# Patient Record
Sex: MALE | Race: WHITE
[De-identification: names, ages, dates, MRNs, and addresses within clinical notes are randomized per-mention and may not be internally consistent; named-entity substitution may affect disease eponyms.]

---

## 2018-02-16 ENCOUNTER — HOSPITAL ENCOUNTER (OUTPATIENT)
Dept: HOSPITAL 72 - SUR | Age: 83
Discharge: HOME | End: 2018-02-16
Payer: MEDICARE

## 2018-02-16 VITALS — SYSTOLIC BLOOD PRESSURE: 141 MMHG | DIASTOLIC BLOOD PRESSURE: 82 MMHG

## 2018-02-16 VITALS — DIASTOLIC BLOOD PRESSURE: 69 MMHG | SYSTOLIC BLOOD PRESSURE: 129 MMHG

## 2018-02-16 VITALS — SYSTOLIC BLOOD PRESSURE: 140 MMHG | DIASTOLIC BLOOD PRESSURE: 76 MMHG

## 2018-02-16 VITALS — SYSTOLIC BLOOD PRESSURE: 138 MMHG | DIASTOLIC BLOOD PRESSURE: 74 MMHG

## 2018-02-16 VITALS — DIASTOLIC BLOOD PRESSURE: 83 MMHG | SYSTOLIC BLOOD PRESSURE: 143 MMHG

## 2018-02-16 VITALS — DIASTOLIC BLOOD PRESSURE: 74 MMHG | SYSTOLIC BLOOD PRESSURE: 139 MMHG

## 2018-02-16 VITALS — DIASTOLIC BLOOD PRESSURE: 60 MMHG | SYSTOLIC BLOOD PRESSURE: 119 MMHG

## 2018-02-16 VITALS — SYSTOLIC BLOOD PRESSURE: 166 MMHG | DIASTOLIC BLOOD PRESSURE: 88 MMHG

## 2018-02-16 VITALS — SYSTOLIC BLOOD PRESSURE: 132 MMHG | DIASTOLIC BLOOD PRESSURE: 71 MMHG

## 2018-02-16 VITALS — BODY MASS INDEX: 21.33 KG/M2 | WEIGHT: 144 LBS | HEIGHT: 69 IN

## 2018-02-16 VITALS — SYSTOLIC BLOOD PRESSURE: 135 MMHG | DIASTOLIC BLOOD PRESSURE: 71 MMHG

## 2018-02-16 VITALS — SYSTOLIC BLOOD PRESSURE: 143 MMHG | DIASTOLIC BLOOD PRESSURE: 80 MMHG

## 2018-02-16 VITALS — SYSTOLIC BLOOD PRESSURE: 124 MMHG | DIASTOLIC BLOOD PRESSURE: 70 MMHG

## 2018-02-16 DIAGNOSIS — E10.22: ICD-10-CM
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
## 2018-02-16 DIAGNOSIS — Z81.8: ICD-10-CM

## 2018-02-16 DIAGNOSIS — Z81.1: ICD-10-CM

## 2018-02-16 DIAGNOSIS — I10: ICD-10-CM

## 2018-02-16 DIAGNOSIS — M19.90: ICD-10-CM

## 2018-02-16 DIAGNOSIS — K21.9: ICD-10-CM

## 2018-02-16 DIAGNOSIS — Z82.49: ICD-10-CM
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
## 2018-02-16 DIAGNOSIS — N18.9: ICD-10-CM

## 2018-02-16 DIAGNOSIS — I12.9: ICD-10-CM

## 2018-02-16 DIAGNOSIS — T81.32XA: Primary | ICD-10-CM
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
## 2018-02-16 DIAGNOSIS — E78.5: ICD-10-CM

## 2018-02-16 PROCEDURE — 66250 FOLLOW-UP SURGERY OF EYE: CPT

## 2018-02-16 PROCEDURE — 94003 VENT MGMT INPAT SUBQ DAY: CPT

## 2018-02-16 PROCEDURE — 94150 VITAL CAPACITY TEST: CPT

## 2018-02-16 PROCEDURE — 82962 GLUCOSE BLOOD TEST: CPT

## 2018-02-16 NOTE — OPERATIVE NOTE - DICTATED
DATE OF OPERATION:  02/16/2018



SURGEON:  Thang Mcallister M.D.



ASSISTANT SURGEON:  None.



ANESTHESIOLOGIST:  Oren Garcia M.D.



ANESTHESIA:  Local/standby/monitored anesthesia care.



PREOPERATIVE DIAGNOSIS:  Wound dehiscence, of DSEK graft, left eye.



POSTOPERATIVE DIAGNOSIS:  Wound dehiscence, of DSEK graft, left

eye.



PROCEDURE:  Repair of wound dehiscence, left eye.



SPECIMENS:  None.



COMPLICATIONS:  None.



INDICATIONS FOR SURGERY:  The patient is status post DSEK of the left eye

and the graft was noted to have slipped superiorly.  The patient

understands the risk of surgery including infection, bleeding, need for

further surgery, loss of vision, no improvement in vision, loss of the

eye, loss of life, glaucoma, retinal detachment, understands these risks

and elects to proceed with surgery.



FINDINGS:  The patient's DSEK graft was displaced superiorly, but also

attached.



OPERATIVE NOTE:  After informed consent was obtained, the patient was

brought into the operating room and placed in a supine position.  Cardiac

and respiratory monitors were attached.   A time-out was performed and all

criteria were met and everyone in the room agreed.  The left eye was

draped and prepped in a sterile manner for ocular surgery.  A lid speculum

was placed in the eye.  I was able to enter _____.  An 8 mm optical zone

marker was used to laura the center of the cornea on the corneal surface.

I then took a reverse Sinskey hook and went through the paracentesis at

approximately 5 o'clock and tried to move the DSEK graft inferiorly, but

it was very well attached.  I then used the same reverse Sinskey hook to

get into the interface to separate the DSEK graft from the host cornea.

After this was done, again air was placed into the anterior chamber and I

was able to slide the DSEK graft inferiorly and centered according to the

overlying laura on the corneal surface.  A full air fill was placed into

the anterior chamber and the graft was noted to be very well centered.  I

then took a curved spatula and struck the corneal surface very gingerly to

try and remove any interface fluid and it was noted that the DSEK graft

did slip inferiorly this time.  I then placed a complete air fill in the

anterior chamber and then used reverse Sinskey hook to reposition the

graft so was nicely centered.  Again, I then tried to stroke the surface

of the corneal surface to try to remove any fluid interface and this time

again the graft did slip nasally.  I then took the Sinskey hook and

brushed it up against the recipient cornea to the overlying 8 mm laura to

try and roughen up the stroma.  I then again was unable to move the donor

disc to the center part of the cornea so was centered according to the

overlying marks on the corneal surface.  Again, I very gingerly stroked

the surface of the cornea to try to remove any interface fluid and at this

time the cornea straightened, nicely centered and positioned, and did not

move.  I let them last a full 15 minutes air fill in the anterior chamber

and the donor disc remained intact and well positioned.  It appeared to be

attached for 360 degrees.  After 15 minutes, the air was partially removed

and replaced with BSS.  The DSEK graft was well positioned and the iris

was in good position and the anterior chamber was very deep.  All wounds

were checked and found to be Dev negative.  The lid speculum and drapes

were removed from the eye and drops of ciprofloxacin and Pred Forte were

applied to the eye, followed by Maxitrol ointment and then a shield.  The

patient left the operating room awake, alert, in stable condition and is

to get Diamox 500 mg IV x1 and to lie flat on his back for one hour prior

to being discharged.









  ______________________________________________

  Thang Mcallister M.D.





DR:  JORGE

D:  02/16/2018 09:19

T:  02/16/2018 20:33

JOB#:  [id number]

CC:

## 2018-02-16 NOTE — PRE-PROCEDURE NOTE/ATTESTATION
Pre-Procedure Note/Attestation


Complete Prior to Procedure


Planned Procedure:  left - Revision of wound, left eye





Indications for Procedure


Pre-Operative Diagnosis:


Wound dehiscence, left eye





Attestation


I attest that I discussed the nature of the procedure; its benefits; risks and 

complications; and alternatives (and the risks and benefits of such alternatives

), prior to the procedure, with the patient (or the patient's legal 

representative).





I attest that, if there was a reasonable possibility of needing a blood 

transfusion, the patient (or the patient's legal representative) was given the 

[facility] standardized written summary, pursuant 

to the Bertin Olga Blood Safety Act ([place] Health and Safety Code # 1645, as 

amended).





I attest that I re-evaluated the patient just prior to the surgery and that 

there has been no change in the patient's H&P, except as documented below:











Thang Mcallister MD Feb 16, 2018 07:36

## 2018-02-16 NOTE — BRIEF OPERATIVE NOTE
Immediate Post Operative Note


Operative Note


Pre-op Diagnosis:


Wound dehiscence, left eye


Procedure:


Repair of wound dehiscence, left eye


Post-op Diagnosis:  same as pre-op


Surgeon:  TOMI Mcallister MD


Anesthesiologist:  Dr Garcia


Anesthesia:  local, MAC


Specimen:  none


Complications:  none


Fluids:  as noted in charg


Estimated Blood Loss:  none


Implant(s) used?:  No











Thang Mcallister MD Feb 16, 2018 08:52

## 2018-02-16 NOTE — DISCHARGE INSTRUCTIONS
Discharge Instructions


Discharge Instructions


Follow Up Orders


Lie flat on back until appointment tomorrow with Dr Mcallister


Keep shield in place except to place eye drops


Continue preop eye drops





For Congestive Heart Failure


Reminder


Report to your physician any weight gain of 5 pounds or more in one week.











Thang Mcallister MD Feb 16, 2018 08:51

## 2018-02-16 NOTE — ANETHESIA PREOPERATIVE EVAL
Anesthesia Pre-op PMH/ROS


General


Date of Evaluation:  Feb 16, 2018


Time of Evaluation:  07:40


Anesthesiologist:  Radha


ASA Score:  ASA 3


Mallampati Score


Class I : Soft palate, uvula, fauces, pillars visible


Class II: Soft palate, uvula, fauces visible


Class III: Soft palate, base of uvula visible


Class IV: Only hard plate visible


Mallampati Classification:  Class II


Surgeon:  L eye corneal transplant malfunction


Diagnosis:  L eye corneal transplant revision


Surgical Procedure:  Ary


Anesthesia History:  none


Family History:  no anesthesia problems


Allergies:  


Coded Allergies:  


     No Known Allergies (Unverified , 2/16/18)


Medications:  see eMAR





Past Medical History


Cardiovascular:  Reports: HTN, 


   Denies: CAD, MI, valve dz, arrhythmia, other


Pulmonary:  Denies: asthma, COPD, TERESSA, other


Gastrointestinal/Genitourinary:  Reports: GERD - mild, CRI, 


   Denies: ESRD, other


Neurologic/Psychiatric:  Denies: dementia, CVA, depression/anxiety, TIA, other


Endocrine:  Reports: DM - Type 1 on insulin pump, 


   Denies: hypothyroidism, steroids, other


HEENT:  Reports: cataract (L), cataract (R), glaucoma, 


   Denies: [place] (L), [place] (R), other


Hematology/Immune:  Denies: anemia, DVT, bleeding disorder, other


Musculoskeletal/Integumentary:  Reports: OA, 


   Denies: RA, DJD, DDD, edema, other


PMH Narrative:


as above


PSxH Narrative:


Multiple eye Sx T&A





Anesthesia Pre-op Phys. Exam


Physician Exam





Last Vital Signs








  Date Time  Temp Pulse Resp B/P (MAP) Pulse Ox O2 Delivery O2 Flow Rate FiO2


 


2/16/18 07:01 97.7 74 20 166/88 100   








Constitutional:  NAD


Neurologic:  CN 2-12 intact


Cardiovascular:  RRR, no M/R/G


Respiratory:  CTA


Gastrointestinal:  S/NT/ND





Airway Exam


Mallampati Score:  Class II


MO:  full


Neck:  stiff


ROM:  limited


Teeth:  missing


Dentures:  no upper, no lower





Anesthesia Pre-op A/P


Labs


see chart





Studies


Pre-op Studies:  EKG - NSR





Risk Assessment & Plan


Assessment:


ASA 3


Plan:


MAC


Status Change Before Surgery:  No





Pre-Antibiotics


Drug:  none











SUSANNA CARPIO M.D. Feb 16, 2018 07:43

## 2018-02-16 NOTE — 48 HOUR POST ANESTHESIA EVAL
Post Anesthesia Evaluation


Procedure:  L eye revision of corneal transplant


Date of Evaluation:  Feb 16, 2018


Time of Evaluation:  11:05


Blood Pressure Systolic:  138


0:  74


Pulse Rate:  62


Respiratory Rate:  20


Temperature (Fahrenheit):  97.6


O2 Sat by Pulse Oximetry:  98


Airway:  patent


Nausea:  No


Vomiting:  No


Pain Intensity:  3


Hydration Status:  adequate


Cardiopulmonary Status:


stable


Mental Status/LOC:  patient returned to baseline


Follow-up Care/Observations:


n/a


Post-Anesthesia Complications:


none


Follow-up care needed:  ready to discharge











SUSANNA CARPIO M.D. Feb 16, 2018 11:06

## 2018-02-16 NOTE — IMMEDIATE POST-OP EVALUATION
Immediate Post-Op Evalulation


Immediate Post-Op Evalulation


Procedure:  L eye revision of corneal transplant


Date of Evaluation:  Feb 16, 2018


Time of Evaluation:  09:08


IV Fluids:  200


Blood Products:  none


Estimated Blood Loss:  none


Urinary Output:  none


Blood Pressure Systolic:  142


Blood Pressure Diastolic:  76


Pulse Rate:  64


Respiratory Rate:  20


O2 Sat by Pulse Oximetry:  99


Temperature (Fahrenheit):  97.8


Pain Score (1-10):  2


Nausea:  No


Vomiting:  No


Complications


none


Patient Status:  awake, patent, none


Hydration Status:  adequate











SUSANNA CARPIO M.D. Feb 16, 2018 09:47